# Patient Record
Sex: FEMALE | Race: WHITE | NOT HISPANIC OR LATINO | Employment: OTHER | URBAN - METROPOLITAN AREA
[De-identification: names, ages, dates, MRNs, and addresses within clinical notes are randomized per-mention and may not be internally consistent; named-entity substitution may affect disease eponyms.]

---

## 2017-09-07 ENCOUNTER — THERAPY VISIT (OUTPATIENT)
Dept: PHYSICAL THERAPY | Facility: CLINIC | Age: 67
End: 2017-09-07
Payer: MEDICARE

## 2017-09-07 DIAGNOSIS — G89.29 CHRONIC BILATERAL LOW BACK PAIN WITH RIGHT-SIDED SCIATICA: Primary | ICD-10-CM

## 2017-09-07 DIAGNOSIS — M54.41 CHRONIC BILATERAL LOW BACK PAIN WITH RIGHT-SIDED SCIATICA: Primary | ICD-10-CM

## 2017-09-07 PROCEDURE — G8979 MOBILITY GOAL STATUS: HCPCS | Mod: GP | Performed by: PHYSICAL THERAPIST

## 2017-09-07 PROCEDURE — 97110 THERAPEUTIC EXERCISES: CPT | Mod: GP | Performed by: PHYSICAL THERAPIST

## 2017-09-07 PROCEDURE — G8978 MOBILITY CURRENT STATUS: HCPCS | Mod: GP | Performed by: PHYSICAL THERAPIST

## 2017-09-07 PROCEDURE — 97161 PT EVAL LOW COMPLEX 20 MIN: CPT | Mod: GP | Performed by: PHYSICAL THERAPIST

## 2017-09-07 NOTE — LETTER
"DEPARTMENT OF HEALTH AND HUMAN SERVICES  CENTERS FOR MEDICARE & MEDICAID SERVICES    PLAN/UPDATED PLAN OF PROGRESS FOR OUTPATIENT REHABILITATION    PATIENTS NAME:  Heaven Canchola   : 1950  PROVIDER NUMBER:    [id number]  [facility]  PR[facility]ER NAME: [facility]  MEDICAL RECORD NUMBER: [id number]   START OF CARE DATE:  SOC Date: 17   TYPE:  PT  PRIMARY/TREATMENT DIAGNOSIS: (Pertinent Medical Diagnosis)  Chronic bilateral low back pain with right-sided sciatica  VISITS FROM START OF CARE:  Rxs Used: 1     [facility] Initial Evaluation  Subjective:  Patient is a 66 year old female presenting with rehab back hpi.   Heaven Canchola is a 66 year old female with a lumbar condition.  Condition occurred with:  Insidious onset.  Condition occurred: for unknown reasons.  This is a chronic condition  Patient notes B LBP with R lateral hip/thigh pain that intermittently goes down to R lateral ankle.  Pain has been present for \"years\" without specific/known cause.  She saw her MD on 17 and was subsequently referred to PT..    Patient reports pain:  Central lumbar spine, lumbar spine left and lumbar spine right.  Radiates to:  Gluteals right and gluteals left (down R lateral thigh and lower leg to lateral ankle).  Pain is described as aching and is intermittent and reported as 3/10.  Associated symptoms:  Loss of motion/stiffness (denies numbness/tingling/weakness). Pain is worse during the night.  Exacerbated by: prolonged walking, prolonged standing, lifting. Relieved by: stretching, adjusting dosage of amitriptyline.  Since onset symptoms are unchanged.  Special tests:  MRI (will call to see if can get reports faxed over).  Previous treatment includes chiropractic.  Improvement with previous treatment: gets relief, but temporary.  General health as reported by patient is good.  Pertinent medical history includes:  Osteoporosis and history " of fractures (chronic fatigue syndrome).  Medical allergies: no.  Surgical history: parotid gland tumor.  Medication history: amitriptyline for chronic fatigue syndrome.  Current occupation is retired.      Barriers include:  None as reported by the patient.  Red flags:  None as reported by the patient.    Objective:  LUMBAR:    Posture: fair  Posture Correction: no effect  Relevant Lateral Shift: none                    PATIENTS NAME:  Heaven Canchola   : 1950    Neurological:  Motor Deficit:  Myotomes L R   L1-2 (hip flexion) 5 5   L3 (knee extension) 5 5   L4 (ankle DF) 5 5   L5 (g. toe ext) 5 5   S1 (ankle PF or knee flex) 5 5   Sensory Deficit, Reflexes: intact light touch screen B LE dermatomes  Dural Signs:   L R   Slump negatiive Stretch in R ITB   SLR negative negative     AROM: (Major, Moderate, Minimal or Nil loss)  Movement Loss Mu Mod Min Nil Pain   Flexion    X To floor no effect   Extension  X   Tightness in buttocks   Side Gliding L    X R LBP   Side Gliding R    X R LBP   Repeated movement testing:   (During: produces, abolishes, increases, decreases, no effect, centralizing, peripheralizing; After: better, worse, no better, no worse, no effect, centralized, peripheralized)  Pre-test Symptoms Standing: B LBP R thigh to knee pain   Symptoms During Symptoms After ROM increased ROM decreased No Effect   FIS        Rep FIS Increased R  Lateral hip worse   X   EIS        Rep EIS Increased B gluteals No worse   X   Pre-test Symptoms Lying: B LBP    Symptoms During Symptoms After ROM increased ROM decreased No Effect   SANDEE        Rep SANDEE Increased B LB worse   X   EIL        Rep EIL Increased B LB No worse   X   Other Tests: forward bend appears symmetric at PSIS B during.     HIP:  PROM:   L  R   Flexion 120 anterior hip pain 120 anterior hip pain   Extension     Abduction     IR 35 25 lateral hip pain   ER 50 45 lateral hip pain     PATIENTS NAME:  Heaven Canchola   : 1950    Special  tests:   L R   Ortegas     Ramesh DOBBS Lateral hip pain Lateral hip pain   FADIR Anterior hip pain Anterior hip pain     Provisional Classification: possible derangement lumbar as main source of symptoms.  Does have some hip joint pain and lateral ITB type pain as well.   Principle of Management: will initiate repeated extension in lying lumbar.  Will work on hip joint and ITB stretching and strengthening as well.  Core strengthening and posture.     Assessment/Plan:    Patient is a 66 year old female with lumbar complaints.    Patient has the following significant findings with corresponding treatment plan.                Diagnosis 1:  LBP with R sided sciatica, some hip joint pain likely as well.     Pain -  hot/cold therapy, manual therapy and directional preference exercise  Decreased ROM/flexibility - manual therapy and therapeutic exercise  Decreased strength - therapeutic exercise and therapeutic activities  Decreased proprioception - neuro re-education and therapeutic activities  Decreased function - therapeutic activities  Impaired posture - neuro re-education    Therapy Evaluation Codes:   1) History comprised of:   Personal factors that impact the plan of care:      Time since onset of symptoms.    Comorbidity factors that impact the plan of care are:      None.     Medications impacting care: None.  2) Examination of Body Systems comprised of:   Body structures and functions that impact the plan of care:      Hip and Lumbar spine.   Activity limitations that impact the plan of care are:      Lifting, Standing, Walking and Sleeping.  3) Clinical presentation characteristics are:   Stable/Uncomplicated.  4) Decision-Making    Low complexity using standardized patient assessment instrument and/or measureable assessment of functional outcome.  Cumulative Therapy Evaluation is: Low complexity.    Previous and current functional limitations:  (See Goal Flow Sheet for this information)    Short term and  "Long term goals: (See Goal Flow Sheet for this information)     Communication ability:  Patient appears to be able to clearly communicate and understand verbal and written communication and follow directions correctly.  Treatment Explanation - The following has been discussed with the patient:   RX ordered/plan of care  PATIENTS NAME:  Heaven Canchola   : 1950    Anticipated outcomes  Possible risks and side effects  This patient would benefit from PT intervention to resume normal activities.   Rehab potential is good.    Frequency:  1 X week, once daily  Duration:  for 6 weeks  Discharge Plan:  Achieve all LTG.  Independent in home treatment program.  Reach maximal therapeutic benefit.        Caregiver Signature/Credentials _____________________________ Date ________       Treating Provider: Jack Patel DPT   I have reviewed and certified the need for these services and plan of treatment while under my care.        PHYSICIAN'S SIGNATURE:   _________________________________________  Date___________   Linda Griffiths MD    Certification period:  Beginning of Cert date period: 17 to  End of Cert period date: 17     Functional Level Progress Report: Please see attached \"Goal Flow sheet for Functional level.\"    ____X____ Continue Services or       ________ DC Services                Service dates: From  SOC Date: 17 date to present                         "

## 2017-09-08 NOTE — PROGRESS NOTES
"[facility] Initial Evaluation    Subjective:    Patient is a 66 year old female presenting with rehab back hpi.   Heaven Canchola is a 66 year old female with a lumbar condition.  Condition occurred with:  Insidious onset.  Condition occurred: for unknown reasons.  This is a chronic condition  Patient notes B LBP with R lateral hip/thigh pain that intermittently goes down to R lateral ankle.  Pain has been present for \"years\" without specific/known cause.  She saw her MD on 8/28/17 and was subsequently referred to PT..    Patient reports pain:  Central lumbar spine, lumbar spine left and lumbar spine right.  Radiates to:  Gluteals right and gluteals left (down R lateral thigh and lower leg to lateral ankle).  Pain is described as aching and is intermittent and reported as 3/10.  Associated symptoms:  Loss of motion/stiffness (denies numbness/tingling/weakness). Pain is worse during the night.  Exacerbated by: prolonged walking, prolonged standing, lifting. Relieved by: stretching, adjusting dosage of amitriptyline.  Since onset symptoms are unchanged.  Special tests:  MRI (will call to see if can get reports faxed over).  Previous treatment includes chiropractic.  Improvement with previous treatment: gets relief, but temporary.  General health as reported by patient is good.  Pertinent medical history includes:  Osteoporosis and history of fractures (chronic fatigue syndrome).  Medical allergies: no.  Surgical history: parotid gland tumor.  Medication history: amitriptyline for chronic fatigue syndrome.  Current occupation is retired.        Barriers include:  None as reported by the patient.    Red flags:  None as reported by the patient.                        Objective:  LUMBAR:    Posture: fair  Posture Correction: no effect  Relevant Lateral Shift: none    Neurological:    Motor Deficit:  Myotomes L R   L1-2 (hip flexion) 5 5   L3 (knee extension) 5 5   L4 (ankle DF) 5 5   L5 (g. toe ext) 5 5 "   S1 (ankle PF or knee flex) 5 5     Sensory Deficit, Reflexes: intact light touch screen B LE dermatomes    Dural Signs:   L R   Slump negatiive Stretch in R ITB   SLR negative negative     AROM: (Major, Moderate, Minimal or Nil loss)  Movement Loss Mu Mod Min Nil Pain   Flexion    X To floor no effect   Extension  X   Tightness in buttocks   Side Gliding L    X R LBP   Side Gliding R    X R LBP     Repeated movement testing:   (During: produces, abolishes, increases, decreases, no effect, centralizing, peripheralizing; After: better, worse, no better, no worse, no effect, centralized, peripheralized)    Pre-test Symptoms Standing: B LBP R thigh to knee pain   Symptoms During Symptoms After ROM increased ROM decreased No Effect   FIS        Rep FIS Increased R  Lateral hip worse   X   EIS        Rep EIS Increased B gluteals No worse   X   Pre-test Symptoms Lying: B LBP    Symptoms During Symptoms After ROM increased ROM decreased No Effect   SANDEE        Rep SANDEE Increased B LB worse   X   EIL        Rep EIL Increased B LB No worse   X     Other Tests: forward bend appears symmetric at PSIS B during.     HIP:    PROM:   L  R   Flexion 120 anterior hip pain 120 anterior hip pain   Extension     Abduction     IR 35 25 lateral hip pain   ER 50 45 lateral hip pain       Special tests:   L R   Roselia's     Ramesh     DILIA Lateral hip pain Lateral hip pain   FADIR Anterior hip pain Anterior hip pain     Provisional Classification: possible derangement lumbar as main source of symptoms.  Does have some hip joint pain and lateral ITB type pain as well.   Principle of Management: will initiate repeated extension in lying lumbar.  Will work on hip joint and ITB stretching and strengthening as well.  Core strengthening and posture.     System    Physical Exam    General     ROS    Assessment/Plan:      Patient is a 66 year old female with lumbar complaints.    Patient has the following significant findings with corresponding  treatment plan.                Diagnosis 1:  LBP with R sided sciatica, some hip joint pain likely as well.     Pain -  hot/cold therapy, manual therapy and directional preference exercise  Decreased ROM/flexibility - manual therapy and therapeutic exercise  Decreased strength - therapeutic exercise and therapeutic activities  Decreased proprioception - neuro re-education and therapeutic activities  Decreased function - therapeutic activities  Impaired posture - neuro re-education    Therapy Evaluation Codes:   1) History comprised of:   Personal factors that impact the plan of care:      Time since onset of symptoms.    Comorbidity factors that impact the plan of care are:      None.     Medications impacting care: None.  2) Examination of Body Systems comprised of:   Body structures and functions that impact the plan of care:      Hip and Lumbar spine.   Activity limitations that impact the plan of care are:      Lifting, Standing, Walking and Sleeping.  3) Clinical presentation characteristics are:   Stable/Uncomplicated.  4) Decision-Making    Low complexity using standardized patient assessment instrument and/or measureable assessment of functional outcome.  Cumulative Therapy Evaluation is: Low complexity.    Previous and current functional limitations:  (See Goal Flow Sheet for this information)    Short term and Long term goals: (See Goal Flow Sheet for this information)     Communication ability:  Patient appears to be able to clearly communicate and understand verbal and written communication and follow directions correctly.  Treatment Explanation - The following has been discussed with the patient:   RX ordered/plan of care  Anticipated outcomes  Possible risks and side effects  This patient would benefit from PT intervention to resume normal activities.   Rehab potential is good.    Frequency:  1 X week, once daily  Duration:  for 6 weeks  Discharge Plan:  Achieve all LTG.  Independent in home treatment  program.  Reach maximal therapeutic benefit.    Please refer to the daily flowsheet for treatment today, total treatment time and time spent performing 1:1 timed codes.

## 2017-09-19 ENCOUNTER — THERAPY VISIT (OUTPATIENT)
Dept: PHYSICAL THERAPY | Facility: CLINIC | Age: 67
End: 2017-09-19
Payer: MEDICARE

## 2017-09-19 DIAGNOSIS — G89.29 CHRONIC BILATERAL LOW BACK PAIN WITH RIGHT-SIDED SCIATICA: ICD-10-CM

## 2017-09-19 DIAGNOSIS — M54.41 CHRONIC BILATERAL LOW BACK PAIN WITH RIGHT-SIDED SCIATICA: ICD-10-CM

## 2017-09-19 PROCEDURE — 97110 THERAPEUTIC EXERCISES: CPT | Mod: GP | Performed by: PHYSICAL THERAPIST

## 2017-09-19 PROCEDURE — 97140 MANUAL THERAPY 1/> REGIONS: CPT | Mod: GP | Performed by: PHYSICAL THERAPIST

## 2017-10-09 ENCOUNTER — THERAPY VISIT (OUTPATIENT)
Dept: PHYSICAL THERAPY | Facility: CLINIC | Age: 67
End: 2017-10-09
Payer: MEDICARE

## 2017-10-09 DIAGNOSIS — M54.41 CHRONIC BILATERAL LOW BACK PAIN WITH RIGHT-SIDED SCIATICA: ICD-10-CM

## 2017-10-09 DIAGNOSIS — G89.29 CHRONIC BILATERAL LOW BACK PAIN WITH RIGHT-SIDED SCIATICA: ICD-10-CM

## 2017-10-09 PROCEDURE — 97140 MANUAL THERAPY 1/> REGIONS: CPT | Mod: GP | Performed by: PHYSICAL THERAPIST

## 2017-10-09 PROCEDURE — 97110 THERAPEUTIC EXERCISES: CPT | Mod: GP | Performed by: PHYSICAL THERAPIST

## 2017-10-30 ENCOUNTER — THERAPY VISIT (OUTPATIENT)
Dept: PHYSICAL THERAPY | Facility: CLINIC | Age: 67
End: 2017-10-30
Payer: MEDICARE

## 2017-10-30 DIAGNOSIS — M54.41 CHRONIC BILATERAL LOW BACK PAIN WITH RIGHT-SIDED SCIATICA: ICD-10-CM

## 2017-10-30 DIAGNOSIS — G89.29 CHRONIC BILATERAL LOW BACK PAIN WITH RIGHT-SIDED SCIATICA: ICD-10-CM

## 2017-10-30 PROCEDURE — 97110 THERAPEUTIC EXERCISES: CPT | Mod: GP | Performed by: PHYSICAL THERAPIST

## 2017-10-30 PROCEDURE — 97140 MANUAL THERAPY 1/> REGIONS: CPT | Mod: GP | Performed by: PHYSICAL THERAPIST

## 2018-07-07 PROBLEM — G89.29 CHRONIC BILATERAL LOW BACK PAIN WITH RIGHT-SIDED SCIATICA: Status: RESOLVED | Noted: 2017-09-07 | Resolved: 2018-07-07

## 2018-07-07 PROBLEM — M54.41 CHRONIC BILATERAL LOW BACK PAIN WITH RIGHT-SIDED SCIATICA: Status: RESOLVED | Noted: 2017-09-07 | Resolved: 2018-07-07

## 2018-07-08 NOTE — PROGRESS NOTES
DISCHARGE REPORT:     Patient did not return for formal reassessment after the last visit on 10/30/17.  Please see completed goal and daily flowsheets from that date, for last know status.  Patient will be discharged at this time.

## 2018-09-24 ENCOUNTER — APPOINTMENT (OUTPATIENT)
Dept: GENERAL RADIOLOGY | Facility: CLINIC | Age: 68
End: 2018-09-24
Attending: EMERGENCY MEDICINE
Payer: MEDICARE

## 2018-09-24 ENCOUNTER — HOSPITAL ENCOUNTER (EMERGENCY)
Facility: CLINIC | Age: 68
Discharge: HOME | End: 2018-09-24
Attending: EMERGENCY MEDICINE | Admitting: EMERGENCY MEDICINE
Payer: MEDICARE

## 2018-09-24 ENCOUNTER — HOSPITAL ENCOUNTER (EMERGENCY)
Facility: CLINIC | Age: 68
Discharge: HOME | End: 2018-09-24
Attending: NURSE PRACTITIONER | Admitting: NURSE PRACTITIONER
Payer: MEDICARE

## 2018-09-24 ENCOUNTER — NURSE TRIAGE (OUTPATIENT)
Dept: NURSING | Facility: CLINIC | Age: 68
End: 2018-09-24

## 2018-09-24 VITALS
RESPIRATION RATE: 16 BRPM | SYSTOLIC BLOOD PRESSURE: 152 MMHG | WEIGHT: 112 LBS | TEMPERATURE: 97.8 F | BODY MASS INDEX: 18.66 KG/M2 | DIASTOLIC BLOOD PRESSURE: 81 MMHG | HEIGHT: 65 IN | OXYGEN SATURATION: 96 %

## 2018-09-24 VITALS
OXYGEN SATURATION: 97 % | TEMPERATURE: 97.6 F | RESPIRATION RATE: 15 BRPM | DIASTOLIC BLOOD PRESSURE: 84 MMHG | SYSTOLIC BLOOD PRESSURE: 137 MMHG | WEIGHT: 111 LBS | HEIGHT: 65 IN | HEART RATE: 76 BPM | BODY MASS INDEX: 18.49 KG/M2

## 2018-09-24 DIAGNOSIS — S52.501A CLOSED FRACTURE OF DISTAL END OF RIGHT RADIUS, UNSPECIFIED FRACTURE MORPHOLOGY, INITIAL ENCOUNTER: ICD-10-CM

## 2018-09-24 DIAGNOSIS — Z46.89 ENCOUNTER FOR CAST CHECK: ICD-10-CM

## 2018-09-24 DIAGNOSIS — S52.611A CLOSED DISPLACED FRACTURE OF STYLOID PROCESS OF RIGHT ULNA, INITIAL ENCOUNTER: ICD-10-CM

## 2018-09-24 PROCEDURE — 40000986 XR WRIST RT G/E 3 VW: Mod: RT

## 2018-09-24 PROCEDURE — 96374 THER/PROPH/DIAG INJ IV PUSH: CPT | Mod: 59

## 2018-09-24 PROCEDURE — 99285 EMERGENCY DEPT VISIT HI MDM: CPT | Mod: 25

## 2018-09-24 PROCEDURE — 99152 MOD SED SAME PHYS/QHP 5/>YRS: CPT

## 2018-09-24 PROCEDURE — 25000132 ZZH RX MED GY IP 250 OP 250 PS 637: Mod: GY | Performed by: EMERGENCY MEDICINE

## 2018-09-24 PROCEDURE — A9270 NON-COVERED ITEM OR SERVICE: HCPCS | Mod: GY | Performed by: EMERGENCY MEDICINE

## 2018-09-24 PROCEDURE — 25000128 H RX IP 250 OP 636: Performed by: EMERGENCY MEDICINE

## 2018-09-24 PROCEDURE — 96376 TX/PRO/DX INJ SAME DRUG ADON: CPT

## 2018-09-24 PROCEDURE — 99283 EMERGENCY DEPT VISIT LOW MDM: CPT | Mod: 25,27

## 2018-09-24 PROCEDURE — 73110 X-RAY EXAM OF WRIST: CPT | Mod: RT

## 2018-09-24 PROCEDURE — 25000128 H RX IP 250 OP 636

## 2018-09-24 PROCEDURE — 40000275 ZZH STATISTIC RCP TIME EA 10 MIN

## 2018-09-24 PROCEDURE — 25605 CLTX DST RDL FX/EPHYS SEP W/: CPT | Mod: RT

## 2018-09-24 RX ORDER — PROPOFOL 10 MG/ML
INJECTION, EMULSION INTRAVENOUS
Status: DISCONTINUED
Start: 2018-09-24 | End: 2018-09-24 | Stop reason: HOSPADM

## 2018-09-24 RX ORDER — HYDROCODONE BITARTRATE AND ACETAMINOPHEN 5; 325 MG/1; MG/1
1 TABLET ORAL EVERY 4 HOURS PRN
Qty: 20 TABLET | Refills: 0 | Status: ON HOLD | OUTPATIENT
Start: 2018-09-24 | End: 2021-11-23

## 2018-09-24 RX ORDER — PROPOFOL 10 MG/ML
100 INJECTION, EMULSION INTRAVENOUS ONCE
Status: COMPLETED | OUTPATIENT
Start: 2018-09-24 | End: 2018-09-24

## 2018-09-24 RX ORDER — HYDROCODONE BITARTRATE AND ACETAMINOPHEN 5; 325 MG/1; MG/1
2 TABLET ORAL ONCE
Status: COMPLETED | OUTPATIENT
Start: 2018-09-24 | End: 2018-09-24

## 2018-09-24 RX ORDER — HYDROMORPHONE HYDROCHLORIDE 1 MG/ML
0.5 INJECTION, SOLUTION INTRAMUSCULAR; INTRAVENOUS; SUBCUTANEOUS
Status: DISCONTINUED | OUTPATIENT
Start: 2018-09-24 | End: 2018-09-24 | Stop reason: HOSPADM

## 2018-09-24 RX ORDER — ONDANSETRON 2 MG/ML
INJECTION INTRAMUSCULAR; INTRAVENOUS
Status: COMPLETED
Start: 2018-09-24 | End: 2018-09-24

## 2018-09-24 RX ADMIN — Medication 0.5 MG: at 02:30

## 2018-09-24 RX ADMIN — HYDROCODONE BITARTRATE AND ACETAMINOPHEN 2 TABLET: 5; 325 TABLET ORAL at 03:30

## 2018-09-24 RX ADMIN — PROPOFOL 80 MG: 10 INJECTION, EMULSION INTRAVENOUS at 02:22

## 2018-09-24 RX ADMIN — Medication 0.5 MG: at 01:09

## 2018-09-24 RX ADMIN — ONDANSETRON 4 MG: 2 INJECTION INTRAMUSCULAR; INTRAVENOUS at 01:09

## 2018-09-24 ASSESSMENT — ENCOUNTER SYMPTOMS
MYALGIAS: 1
HEADACHES: 0
MYALGIAS: 0
ARTHRALGIAS: 1
NUMBNESS: 0

## 2018-09-24 NOTE — ED PROVIDER NOTES
"  History     Chief Complaint:  Arm Injury    HPI   Heaven Canchola is a right hand dominant 67 year old female with a history of osteopenia, chronic fatigue, and hypotension, who presents with right wrist pain. The patient states that she was unable to fall asleep this evening, and so sometime during the night got out of her bed. Upon standing, however, she believes that her socks must have slipped on the hardwood floor, causing her to fall into the wall, injuring her right wrist. She denies any other injuries at this time.     Allergies:  No known drug allergies    Medications:    Amitriptyline  Tramadol  Elavil    Past Medical History:    Osteopenia  Hypotension  Chronic fatigue    Past Surgical History:    Breast biopsy, bilateral  Colonoscopy  Superficial parotidectomy    Family History:    Bronchitis    Social History:  The patient was accompanied to the ED by her .  Smoking Status: Never  Smokeless Tobacco: Never  Alcohol Use: Not on file  Marital Status:       Review of Systems   Musculoskeletal: Positive for arthralgias and myalgias.   Neurological: Negative for syncope and headaches.   All other systems reviewed and are negative.    Physical Exam   First Vitals:  BP: (!) 150/94  Heart Rate: 73  Temp: 97.6  F (36.4  C)  Resp: 18  Height: 165.1 cm (5' 5\")  Weight: 50.3 kg (111 lb)  SpO2: 100 %    Physical Exam  Nursing note and vitals reviewed.  Constitutional:  Oriented to person, place, and time. Cooperative.   HENT:   Nose:    Nose normal.   Mouth/Throat:   Mucous membranes are normal.   Eyes:    Conjunctivae normal and EOM are normal.      Pupils are equal, round, and reactive to light.   Neck:    Trachea normal.   Cardiovascular:  Normal rate, regular rhythm, normal heart sounds and normal pulses. No murmur heard.  Pulmonary/Chest:  Effort normal and breath sounds normal.   Abdominal:   Soft. Normal appearance and bowel sounds are normal.      There is no tenderness.      There is no rebound " and no CVA tenderness.   Musculoskeletal:  Obvious deformity to the right wrist where there is swelling and tenderness to palpation.  Extremities otherwise atraumatic x 4.   Lymphadenopathy:  No cervical adenopathy.   Neurological:   Alert and oriented to person, place, and time. Normal strength.      No cranial nerve deficit or sensory deficit. GCS eye subscore is 4. GCS verbal subscore is 5. GCS motor subscore is 6.  Distal CMS intact in the right hand.  Skin:    Skin is intact. No rash noted.   Psychiatric:   Normal mood and affect.    Emergency Department Course     Imaging:  Radiology findings were communicated with the patient who voiced understanding of the findings.  XR Wrist Right 3 Views:  IMPRESSION:   1. Acute comminuted, impacted fracture of the distal right radius.  There is 0.9 cm of posterior displacement and moderate posterior  angulation about the fracture. No convincing involvement of the  radiocarpal joint.  2. Minimally displaced acute comminuted transverse fracture of the  ulnar styloid process.    Per radiology report    XR Wrist Right 3 Views (post reduction):  IMPRESSION:  1. Acute comminuted and impacted fracture of the distal right radius.  There is 0.9 cm of posterior displacement and moderate posterior  angulation about the fracture. No convincing involvement of the  radiocarpal joint.  2. Minimally displaced acute comminuted transverse fracture of the  ulnar styloid process.    THERESE JONES MD    Procedures:    [facility] Procedure Note        Sedation:      Performed by: Windy Espana  Authorized by: Windy Espana    Pre-Procedure Assessment done at 0200.    Expected Level:  Deep Sedation    Indication:  Sedation is required to allow for fracture reduction    Consent obtained from patient after discussing the risks, benefits and alternatives.    PO Intake:  Appropriately NPO for procedure    ASA Class:  Class 1 - HEALTHY PATIENT    Mallampati:  Grade 1:  Soft palate,  uvula, tonsillar pillars, and posterior pharyngeal wall visible    Lungs: Lungs Clear with good breath sounds bilaterally.     Heart: Normal heart sounds and rate    History and physical reviewed and no updates needed. I have reviewed the lab findings, diagnostic data, medications, and the plan for sedation. I have determined this patient to be an appropriate candidate for the planned sedation and procedure and have reassessed the patient IMMEDIATELY PRIOR to sedation and procedure.    Sedation Post Procedure Summary:    Prior to the start of the procedure and with procedural staff participation, I verbally confirmed the patient s identity using two indicators, relevant allergies, that the procedure was appropriate and matched the consent or emergent situation, and that the correct equipment/implants were available. Immediately prior to starting the procedure I conducted the Time Out with the procedural staff and re-confirmed the patient s name, procedure, and site/side. (The Joint Commission universal protocol was followed.)  Yes      Sedatives: Propofol    Vital signs, airway, End Tidal CO2 and pulse oximetry were monitored and remained stable throughout the procedure and sedation was maintained until the procedure was complete.  The patient was monitored by staff until sedation discharge criteria were met.    Patient tolerance: Patient tolerated the procedure well with no immediate complications.    Time of sedation in minutes:  10 minutes from beginning to end of physician one to one monitoring.    PROCEDURE: Wrist reduction, right    Anesthesia: The patient was consciously sedated by Dr. Shaun Nicholas     Technique: Traction was applied and angulation was recreated and reduced. Good alignment  was confirmed by fluoroscopy and post reduction films were obtained. The patient tolerated the  procedure well and there were no complications.    Interventions:  0109 - Dilaudid 0.5 mg IV  0109 - Zofran 2 mg/mL  injection  0222 - Diprivan 10 mg/mL vial IV  0330 - Norco 325 mg 2 tablets PO    Emergency Department Course:  Nursing notes and vitals reviewed.    The patient was sent for a right wrist x-ray while in the emergency department, results above.     0055: I performed an exam of the patient as documented above.   0220: procedure performed as noted above.     Findings and plan explained to the Patient. Patient discharged home with instructions regarding supportive care, medications, and reasons to return. The importance of close follow-up was reviewed.     Impression & Plan      Medical Decision Making:  This is a 67-year-old female who came in for further evaluation of an injury to her right wrist.  Based on her exam, I was quite confident that she would have a fracture.  X-rays confirmed this.  I then reduced the fracture per the above procedure note using procedural anesthesia.  We placed her in a plaster sugar tong splint as well.  She was provided a sling to go home with as well as a prescription for Norco.  I will have her follow-up with Dr. Leslie at [facility], and they left a message on her voicemail with the patient's contact information.    Diagnosis:    ICD-10-CM    1. Comminuted closed fracture of distal end of right radius, unspecified fracture morphology, initial encounter S52.501A    2. Closed displaced fracture of styloid process of right ulna, initial encounter S52.611A        Disposition:  discharged to home    Discharge Medication List as of 9/24/2018  3:17 AM      START taking these medications    Details   HYDROcodone-acetaminophen (NORCO) 5-325 MG per tablet Take 1 tablet by mouth every 4 hours as needed for pain, Disp-20 tablet, R-0, Local Print               Windy Espana  9/24/2018    EMERGENCY DEPARTMENT  Windy HAMMER am serving as a scribe at 12:55 AM on 9/24/2018 to document services personally performed by Shaun Nicholas MD based on my observations and the  provider's statements to me.       Shaun Nicholas MD  09/24/18 [id number]

## 2018-09-24 NOTE — ED AVS SNAPSHOT
Emergency Department    [address] [place] [id number]    Phone:  [phone]    Fax:  [phone]                                       Heaven Canchola   MRN: [id number]    Department:   Emergency Department   Date of Visit:  9/24/2018           After Visit Summary Signature Page     I have received my discharge instructions, and my questions have been answered. I have discussed any challenges I see with this plan with the nurse or doctor.    ..........................................................................................................................................  Patient/Patient Representative Signature      ..........................................................................................................................................  Patient Representative Print Name and Relationship to Patient    ..................................................               ................................................  Date                                   Time    ..........................................................................................................................................  Reviewed by Signature/Title    ...................................................              ..............................................  Date                                               Time          22EPIC Rev 08/18

## 2018-09-24 NOTE — TELEPHONE ENCOUNTER
Heaven broke her right arm and cast put on at the hospital.  Denies numbness or tingling.  Heaven has questions on what to look for if cast is too tight.

## 2018-09-24 NOTE — ED AVS SNAPSHOT
Emergency Department    [address] [facility] [address]    Phone:  [phone]    Fax:  [phone]                                       Heaven Canchola   MRN: [id number]    Department:   Emergency Department   Date of Visit:  9/24/2018           Patient Information     Date Of Birth          1950        Your diagnoses for this visit were:     Encounter for cast check        You were seen by Dione Frazier, CNP.      Follow-up Information     Follow up with Linda Griffiths.    Specialty:  Internal Medicine    Why:  As needed    Contact information:    [facility]  [address] [facility] [place] [facility] [id number] [phone]          Follow up with Your orthopedist .    Why:  as scheduled        Follow up with  Emergency Department.    Specialty:  EMERGENCY MEDICINE    Why:  As needed, If symptoms worsen    Contact information:    [address] [facility] [id number] [phone]      Discharge References/Attachments     CAST, CARE OF YOUR (ENGLISH)      24 Hour Appointment Hotline       To make an appointment at any [facility], call [phone] ([phone]). If you don't have a family doctor or clinic, we will help you find one. [facility] clinics are conveniently located to serve the needs of you and your family.             Review of your medicines      Our records show that you are taking the medicines listed below. If these are incorrect, please call your family doctor or clinic.        Dose / Directions Last dose taken    AMITRIPTYLINE HCL PO        Take  by mouth.   Refills:  0        calcium carbonate 500 mg {elemental} 500 MG tablet   Commonly known as:  OS-BEN   Dose:  500 mg        Take 500 mg by mouth 2 times daily   Refills:  0        HYDROcodone-acetaminophen 5-325 MG per tablet   Commonly known as:  NORCO   Dose:  1 tablet   Quantity:  20 tablet        Take 1 tablet by mouth every 4 hours as needed for pain   Refills:  0        Multi-vitamin Tabs  tablet   Dose:  1 tablet        Take 1 tablet by mouth daily   Refills:  0        TRAMADOL HCL PO        Take  by mouth.   Refills:  0                Orders Needing Specimen Collection     None      Pending Results     No orders found from 9/22/2018 to 9/25/2018.            Pending Culture Results     No orders found from 9/22/2018 to 9/25/2018.            Pending Results Instructions     If you had any lab results that were not finalized at the time of your Discharge, you can call the ED Lab Result RN at [phone]. You will be contacted by this team for any positive Lab results or changes in treatment. The nurses are available 7 days a week from 10A to 6:30P.  You can leave a message 24 hours per day and they will return your call.        Test Results From Your Hospital Stay               Clinical Quality Measure: Blood Pressure Screening     Your blood pressure was checked while you were in the emergency department today. The last reading we obtained was  BP: 152/81 . Please read the guidelines below about what these numbers mean and what you should do about them.  If your systolic blood pressure (the top number) is less than 120 and your diastolic blood pressure (the bottom number) is less than 80, then your blood pressure is normal. There is nothing more that you need to do about it.  If your systolic blood pressure (the top number) is 120-139 or your diastolic blood pressure (the bottom number) is 80-89, your blood pressure may be higher than it should be. You should have your blood pressure rechecked within a year by a primary care provider.  If your systolic blood pressure (the top number) is 140 or greater or your diastolic blood pressure (the bottom number) is 90 or greater, you may have high blood pressure. High blood pressure is treatable, but if left untreated over time it can put you at risk for heart attack, stroke, or kidney failure. You should have your blood pressure rechecked by a primary care  "provider within the next 4 weeks.  If your provider in the emergency department today gave you specific instructions to follow-up with your doctor or provider even sooner than that, you should follow that instruction and not wait for up to 4 weeks for your follow-up visit.        Thank you for choosing [facility]       Thank you for choosing [facility] for your care. Our goal is always to provide you with excellent care. Hearing back from our patients is one way we can continue to improve our services. Please take a few minutes to complete the written survey that you may receive in the mail after you visit with us. Thank you!        [employer] Information     [employer] lets you send messages to your doctor, view your test results, renew your prescriptions, schedule appointments and more. To sign up, go to [email or web address] . Click on \"Log in\" on the left side of the screen, which will take you to the Welcome page. Then click on \"Sign up Now\" on the right side of the page.     You will be asked to enter the access code listed below, as well as some personal information. Please follow the directions to create your username and password.     Your access code is: [email or web address]  Expires: 2018  3:17 AM     Your access code will  in 90 days. If you need help or a new code, please call your [facility] clinic or [phone].        Care EveryWhere ID     This is your Care EveryWhere ID. This could be used by other organizations to access your [facility] medical records  [id number]        Equal Access to Services     LUDMILA JANE AH: Hadii keila Barajas, waaxda dominick, qaybta kaalmaishan aguila . So [facility] [phone].    ATENCIÓN: Si habla español, tiene a black disposición servicios gratuitos de asistencia lingüística. Llame al [phone].    We comply with applicable federal civil rights laws and [place] laws. We do not discriminate on the basis of race, color, " national origin, age, disability, sex, sexual orientation, or gender identity.            After Visit Summary       This is your record. Keep this with you and show to your community pharmacist(s) and doctor(s) at your next visit.

## 2018-09-24 NOTE — TELEPHONE ENCOUNTER
FNA triage call :   Presenting problem :  @3am today discharged  From [facility] ED Dx     With  FX of R arm and has new splint .    Guideline used : Splint symptoms or questions - adults.   Disposition and recommendations :  FNA Advised to elevate arm on 2 large  pillow at a table or desk to get RUE above the level of the heart if any problems of tingling or numbness or coldness or blueness and if not improved in 1 hour return to ED and Pt agrees. Pt Has appt with ortho tomorrow to determine if surgery is needed.    Verbalizes understanding and denies further questions .  Ema Nolan RN  - [facility] Nurse Advisor     Reason for Disposition    [1] Fingers or toes are swollen (compared to noninjured side) AND [2] persists > 24 hours after loosening elastic bandage    Additional Information    Splint or elastic bandage problems or questions    Negative: Cast problems or questions    Negative: Chest pain    Negative: Difficulty breathing    Negative: [1] Numbness (loss of sensation) of fingers or toes AND [2] persists > 1 hour after loosening elastic bandage    Negative: [1] Tingling (pins and needles sensation) of fingers or toes AND [2] persists > 1 hour after loosening elastic bandage    Negative: [1] Blueness or pallor of fingers or toes (compared to noninjured side) AND [2] persists > 1 hour after loosening elastic bandage    Negative: Patient sounds very sick or weak to the triager    Negative: [1] SEVERE pain AND [2] not improved one hour after pain medicine, elevation, and loosening elastic bandage    Negative: [1] Increasing pain under splint AND [2] splint put on > 72 hours ago    Negative: Caller has URGENT question and triager unable to answer question    Negative: Splint breaks or cracks    Negative: Edge of splint is causing a sore    Negative: [1] Plaster splint gets wet AND [2] is soft after attempted drying    Negative: [1] Splint lining gets wet AND [2] pins present and touching wet area    Negative:  Caller has NON-URGENT question and triager unable to answer question    Protocols used: SPLINT SYMPTOMS AND QUESTIONS-ADULT-AH, CAST SYMPTOMS AND QUESTIONS-ADULT-AH

## 2018-09-24 NOTE — DISCHARGE INSTRUCTIONS
Understanding a Distal Radius Fracture      A fracture is a broken bone. A fracture in the distal radius is a break in the lower end of the radius. This is the larger bone in the forearm. Because the break occurs near the wrist, it is often called a wrist fracture.    The bone may be cracked, or it may be broken into 2 or more pieces. The pieces of bone may be lined up or they may have moved out of place. Sometimes, the bone may break through the skin. Nearby nerves, tissues, and joints also may be damaged. Depending on the severity of the fracture, healing may take several months or longer.  What causes a distal radius fracture?  This type of fracture is most often caused from a fall on an outstretched hand. It can also be caused from a blow, accident, or sports injury.  Symptoms of a distal radius fracture  Symptoms can include pain, swelling, and bruising. If the bone breaks through the skin, external bleeding can also occur. The wrist may look crooked, deformed, or bent. It may be hard to move or use the arm, wrist, and hand for normal tasks and activities.  Treating a distal radius fracture  Treatment depends on how serious the fracture is. If needed, the bone is put back into place. This may be done with or without surgery. If surgery is needed, the surgeon may use devices such as pins, plates, or screws to hold the bone together. You may need to wear a splint or cast for a month or longer to protect the bone and keep it in place during healing. Other treatments may be also used to help reduce symptoms or regain function. These include:    Cold packs. Putting an ice pack on the injured area may help reduce swelling and pain.    Raising the arm and wrist. Keeping the arm and wrist raised above heart level may help reduce swelling.    Pain medicines. Taking prescription or over-the-counter pain medicines may help reduce pain and swelling.    Exercises. Doing certain exercises at home or with a physical  therapist can help restore strength, flexibility, and range of motion in your arm, wrist, and hand. In general, exercises are not started until after the splint or cast is removed.  Possible complications of a distal radius fracture  These can include:    Poor healing of the bone    Weakness, stiffness, or loss of range of motion in the arm, wrist, or hand    Osteoarthritis in the wrist joint  When to call your healthcare provider  Call your healthcare provider right away if you have any of these:    Fever of 100.4 F (38 C) or higher, or as directed    Symptoms that don t get better with treatment, or get worse    Numbness, coldness, or swelling in your arm, hand, or fingers    Fingernails that turn blue or gray in color    A splint or cast that is damaged or feels too tight or loose    New symptoms   Date Last Reviewed: 3/10/2016    2290-0736 [employer]. [address] [place] [address]. All rights reserved. This information is not intended as a substitute for professional medical care. Always follow your healthcare professional's instructions.

## 2018-09-24 NOTE — ED AVS SNAPSHOT
Emergency Department    [address] [place] [address]    Phone:  [phone]    Fax:  [phone]                                       Heaven Canchola   MRN: [id number]    Department:   Emergency Department   Date of Visit:  9/24/2018           After Visit Summary Signature Page     I have received my discharge instructions, and my questions have been answered. I have discussed any challenges I see with this plan with the nurse or doctor.    ..........................................................................................................................................  Patient/Patient Representative Signature      ..........................................................................................................................................  Patient Representative Print Name and Relationship to Patient    ..................................................               ................................................  Date                                   Time    ..........................................................................................................................................  Reviewed by Signature/Title    ...................................................              ..............................................  Date                                               Time          22EPIC Rev 08/18

## 2018-09-24 NOTE — ED AVS SNAPSHOT
Emergency Department    [address] [facility] [address]    Phone:  [phone]    Fax:  [phone]                                       Heaven Canchola   MRN: [id number]    Department:   Emergency Department   Date of Visit:  9/24/2018           Patient Information     Date Of Birth          1950        Your diagnoses for this visit were:     Comminuted closed fracture of distal end of right radius, unspecified fracture morphology, initial encounter     Closed displaced fracture of styloid process of right ulna, initial encounter        You were seen by Shaun Nicholas MD.      Follow-up Information     Schedule an appointment as soon as possible for a visit with Aure Leslie MD.    Specialty:  Orthopedics    Contact information:    [facility]  [address] [place] [id number] [phone]          Follow up with  Emergency Department.    Specialty:  EMERGENCY MEDICINE    Why:  If symptoms worsen    Contact information:    [address] [facility] [address] [phone]        Discharge Instructions         Understanding a Distal Radius Fracture      A fracture is a broken bone. A fracture in the distal radius is a break in the lower end of the radius. This is the larger bone in the forearm. Because the break occurs near the wrist, it is often called a wrist fracture.    The bone may be cracked, or it may be broken into 2 or more pieces. The pieces of bone may be lined up or they may have moved out of place. Sometimes, the bone may break through the skin. Nearby nerves, tissues, and joints also may be damaged. Depending on the severity of the fracture, healing may take several months or longer.  What causes a distal radius fracture?  This type of fracture is most often caused from a fall on an outstretched hand. It can also be caused from a blow, accident, or sports injury.  Symptoms of a distal radius fracture  Symptoms can  include pain, swelling, and bruising. If the bone breaks through the skin, external bleeding can also occur. The wrist may look crooked, deformed, or bent. It may be hard to move or use the arm, wrist, and hand for normal tasks and activities.  Treating a distal radius fracture  Treatment depends on how serious the fracture is. If needed, the bone is put back into place. This may be done with or without surgery. If surgery is needed, the surgeon may use devices such as pins, plates, or screws to hold the bone together. You may need to wear a splint or cast for a month or longer to protect the bone and keep it in place during healing. Other treatments may be also used to help reduce symptoms or regain function. These include:    Cold packs. Putting an ice pack on the injured area may help reduce swelling and pain.    Raising the arm and wrist. Keeping the arm and wrist raised above heart level may help reduce swelling.    Pain medicines. Taking prescription or over-the-counter pain medicines may help reduce pain and swelling.    Exercises. Doing certain exercises at home or with a physical therapist can help restore strength, flexibility, and range of motion in your arm, wrist, and hand. In general, exercises are not started until after the splint or cast is removed.  Possible complications of a distal radius fracture  These can include:    Poor healing of the bone    Weakness, stiffness, or loss of range of motion in the arm, wrist, or hand    Osteoarthritis in the wrist joint  When to call your healthcare provider  Call your healthcare provider right away if you have any of these:    Fever of 100.4 F (38 C) or higher, or as directed    Symptoms that don t get better with treatment, or get worse    Numbness, coldness, or swelling in your arm, hand, or fingers    Fingernails that turn blue or gray in color    A splint or cast that is damaged or feels too tight or loose    New symptoms   Date Last Reviewed:  3/10/2016    [phone]. [address] [place] [address], [place] [address]. All rights reserved. This information is not intended as a substitute for professional medical care. Always follow your healthcare professional's instructions.          24 Hour Appointment Hotline       To make an appointment at any [facility], call [phone] ([phone]). If you don't have a family doctor or clinic, we will help you find one. [facility] are conveniently located to serve the needs of you and your family.             Review of your medicines      START taking        Dose / Directions Last dose taken    HYDROcodone-acetaminophen 5-325 MG per tablet   Commonly known as:  NORCO   Dose:  1 tablet   Quantity:  20 tablet        Take 1 tablet by mouth every 4 hours as needed for pain   Refills:  0          Our records show that you are taking the medicines listed below. If these are incorrect, please call your family doctor or clinic.        Dose / Directions Last dose taken    AMITRIPTYLINE HCL PO        Take  by mouth.   Refills:  0        calcium carbonate 500 mg {elemental} 500 MG tablet   Commonly known as:  OS-BEN   Dose:  500 mg        Take 500 mg by mouth 2 times daily   Refills:  0        Multi-vitamin Tabs tablet   Dose:  1 tablet        Take 1 tablet by mouth daily   Refills:  0        TRAMADOL HCL PO        Take  by mouth.   Refills:  0                Information about OPIOIDS     PRESCRIPTION OPIOIDS: WHAT YOU NEED TO KNOW   We gave you an opioid (narcotic) pain medicine. It is important to manage your pain, but opioids are not always the best choice. You should first try all the other options your care team gave you. Take this medicine for as short a time (and as few doses) as possible.    Some activities can increase your pain, such as bandage changes or therapy sessions. It may help to take your pain medicine 30 to 60 minutes before these activities. Reduce your stress by getting  enough sleep, working on hobbies you enjoy and practicing relaxation or meditation. Talk to your care team about ways to manage your pain beyond prescription opioids.    These medicines have risks:    DO NOT drive when on new or higher doses of pain medicine. These medicines can affect your alertness and reaction times, and you could be arrested for driving under the influence (DUI). If you need to use opioids long-term, talk to your care team about driving.    DO NOT operate heavy machinery    DO NOT do any other dangerous activities while taking these medicines.    DO NOT drink any alcohol while taking these medicines.     If the opioid prescribed includes acetaminophen, DO NOT take with any other medicines that contain acetaminophen. Read all labels carefully. Look for the word  acetaminophen  or  Tylenol.  Ask your pharmacist if you have questions or are unsure.    You can get addicted to pain medicines, especially if you have a history of addiction (chemical, alcohol or substance dependence). Talk to your care team about ways to reduce this risk.    All opioids tend to cause constipation. Drink plenty of water and eat foods that have a lot of fiber, such as fruits, vegetables, prune juice, apple juice and high-fiber cereal. Take a laxative (Miralax, milk of magnesia, Colace, Senna) if you don t move your bowels at least every other day. Other side effects include upset stomach, sleepiness, dizziness, throwing up, tolerance (needing more of the medicine to have the same effect), physical dependence and slowed breathing.    Store your pills in a secure place, locked if possible. We will not replace any lost or stolen medicine. If you don t finish your medicine, please throw away (dispose) as directed by your pharmacist. The [place] [employer] has more information about safe disposal: https://www.pca.state.[place]/living-green/managing-unwanted-medications        Prescriptions were sent or printed at  these locations (1 Prescription)                   Other Prescriptions                Printed at Department/Unit printer (1 of 1)         HYDROcodone-acetaminophen (NORCO) 5-325 MG per tablet                Procedures and tests performed during your visit     Procedure/Test Number of Times Performed    Peripheral IV catheter 1    XR Wrist Right 3 Views 2      Orders Needing Specimen Collection     None      Pending Results     No orders found from 9/22/2018 to 9/25/2018.            Pending Culture Results     No orders found from 9/22/2018 to 9/25/2018.            Pending Results Instructions     If you had any lab results that were not finalized at the time of your Discharge, you can call the ED Lab Result RN at [phone]. You will be contacted by this team for any positive Lab results or changes in treatment. The nurses are available 7 days a week from 10A to 6:30P.  You can leave a message 24 hours per day and they will return your call.        Test Results From Your Hospital Stay        9/24/2018  3:13 AM      Narrative     RIGHT WRIST 3 VIEWS   9/24/2018 1:30 AM     HISTORY: Pain after fall.    COMPARISON: None.        Impression     IMPRESSION:  1. Acute comminuted and impacted fracture of the distal right radius.  There is 0.9 cm of posterior displacement and moderate posterior  angulation about the fracture. No convincing involvement of the  radiocarpal joint.  2. Minimally displaced acute comminuted transverse fracture of the  ulnar styloid process.    THERESE JONES MD         9/24/2018  3:12 AM      Narrative     RIGHT WRIST 3 VIEWS   9/24/2018 2:52 AM     HISTORY: Postreduction.    COMPARISON: 9/24/2018 at 0124 hours.        Impression     IMPRESSION:   1. Interval placement of a cast, which obscures underlying bone  detail.  2. Acute fractures of the distal right radius and ulna are again  noted, with interval improvement in alignment. The major fracture  fragments are now in near-anatomic  alignment.    THERESE JONES MD                Clinical Quality Measure: Blood Pressure Screening     Your blood pressure was checked while you were in the emergency department today. The last reading we obtained was  BP: 137/84 . Please read the guidelines below about what these numbers mean and what you should do about them.  If your systolic blood pressure (the top number) is less than 120 and your diastolic blood pressure (the bottom number) is less than 80, then your blood pressure is normal. There is nothing more that you need to do about it.  If your systolic blood pressure (the top number) is 120-139 or your diastolic blood pressure (the bottom number) is 80-89, your blood pressure may be higher than it should be. You should have your blood pressure rechecked within a year by a primary care provider.  If your systolic blood pressure (the top number) is 140 or greater or your diastolic blood pressure (the bottom number) is 90 or greater, you may have high blood pressure. High blood pressure is treatable, but if left untreated over time it can put you at risk for heart attack, stroke, or kidney failure. You should have your blood pressure rechecked by a primary care provider within the next 4 weeks.  If your provider in the emergency department today gave you specific instructions to follow-up with your doctor or provider even sooner than that, you should follow that instruction and not wait for up to 4 weeks for your follow-up visit.        Thank you for choosing [facility]       Thank you for choosing [facility] for your care. Our goal is always to provide you with excellent care. Hearing back from our patients is one way we can continue to improve our services. Please take a few minutes to complete the written survey that you may receive in the mail after you visit with us. Thank you!        [facility] [employer] Information     [employer] lets you send messages to your doctor, view your test results, renew your prescriptions,  "schedule appointments and more. To sign up, go to [email or web address] . Click on \"Log in\" on the left side of the screen, which will take you to the Welcome page. Then click on \"Sign up Now\" on the right side of the page.     You will be asked to enter the access code listed below, as well as some personal information. Please follow the directions to create your username and password.     Your access code is: [email or web address]  Expires: 2018  3:17 AM     Your access code will  in 90 days. If you need help or a new code, please call your [facility] clinic or [phone].        Care EveryWhere ID     This is your Care EveryWhere ID. This could be used by other organizations to access your [facility] medical records  [id number]        Equal Access to Services     LUDMILA JANE : Rajat Barajas, josesito tan, joel martin, ishan solorzano . So [facility] [phone].    ATENCIÓN: Si habla español, tiene a black disposición servicios gratuitos de asistencia lingüística. Llame al [phone].    We comply with applicable federal civil rights laws and [place] laws. We do not discriminate on the basis of race, color, national origin, age, disability, sex, sexual orientation, or gender identity.            After Visit Summary       This is your record. Keep this with you and show to your community pharmacist(s) and doctor(s) at your next visit.                  "

## 2018-09-25 NOTE — ED PROVIDER NOTES
"  History     Chief Complaint:    Cast Problem     HPI   Heaven Canchola is a 67 year old female who presents to the ED for evaluation of a cast problem. The patient was seen overnight last night and was diagnosed with a right wrist fracture. The fracture was reduced and a sugar tong splint was placed and she has been wearing a sling since then. Tonight, the patient state that she her fingers turned blue after her shower and which prompted her return to the ED. Here fingers have since returned to normal color. She states that she is still able to move her fingers, has normal sensation in her hands, and denies any new or worsening pain in her wrist or in the area of her cast. Her pain is 2/10.  She has taken no medication for her pain today. She notes some elbow discomfort which she attributes to her sling.     Allergies:  The patient has no known drug allergies.    Medications:    Amitriptyline  Norco  Tramadol    Past Medical History:    Chronic fatigue syndrome    Past Surgical History:    Breast biopsy  Breast surgery  Colonoscopy    Family History:    Bronchitis    Social History:  Negative for tobacco use.  Negative for alcohol use.  Marital Status:   [2]    Review of Systems   Musculoskeletal: Negative for myalgias.   Neurological: Negative for numbness.   All other systems reviewed and are negative.    Physical Exam     Patient Vitals for the past 24 hrs:   BP Temp Temp src Heart Rate Resp SpO2 Height Weight   09/24/18 2049 152/81 97.8  F (36.6  C) Oral 84 16 96 % 1.651 m (5' 5\") 50.8 kg (112 lb)     Physical Exam  Nursing notes reviewed. Vitals reviewed.  General: Alert. Well kept.  Eyes:  Conjunctiva non-injected, non-icteric.  Neck/Throat: Moist mucous membranes. Normal voice.  Cardiac: Regular rhythm. Normal heart sounds.  Pulmonary: Clear and equal breath sounds bilaterally.   Musculoskeletal: Wearing splint on right upper extremity.  Normal sensation and motion of all fingers right hand with pink " color and warm to touch.  No pain to palpation over medial or lateral forearm.  No erythema or abrasion to area under cast.   Neurological: Alert and oriented x4.   Skin: Warm and dry. Normal appearance of visualized exposed skin without rashes or petechiae.  Psych: Affect normal. Good eye contact.    Emergency Department Course   Emergency Department Course:  Nursing notes and vitals reviewed. (2107) I performed an exam of the patient as documented above.     Findings and plan explained to the Patient. Patient discharged home with instructions regarding supportive care, medications, and reasons to return. The importance of close follow-up was reviewed.     Impression & Plan    Medical Decision Making:  Heaven Canchola is a 67 year old female who presents for evaluation of cast check. She was seen in the ED approximately 19 hours ago with a commuted closed fracture of the distal end of the right radius, has procedural sedation and wrist reduction completed. She was placed in a sugar tong splint and discharged home. She states her pain is only over the distal radius and ulna,  is mild at 2/10, and she has not required any pain medication today.  She notes that after being in the shower she had bluish fingers and presents for evaluation. On examination her fingers are currently pink, warm, with intact sensation with complete flexion and extension without pain or weakness. She notes her symptoms are currently completely resolved. The upper part of the cast was unwrapped and I was able to palpate along the compartments on the dorsal and the volar aspect of the arm and she has no tenderness to palpation and denies pain currently over those sites. She had some mild tenderness in the antecubital area and was noted to have some twisting of the padding material which was straightened out and she had near resolution of her discomfort in the antecubital area. There are no signs of venous or arterial insufficiency due to swelling,  the cast is without wetness, and has not caused restriction. There are no signs of compartment syndrome. She has normal function in the hand. No indication for cast removal or re-XR imaging today with complete resolution of her symptoms. Symptoms are likely related to being in a dependent position after the shower and she also did have a tight band around the upper arm to hold the trash bag that covered her cast  in place which may have caused some constriction. She is given detailed instructions on symptoms to watch for including increasing pain, any paresthesia, any pallor, and inability to move her fingers, or any change in her symptoms and she will return to the ED.     Diagnosis:    ICD-10-CM    1. Encounter for cast check Z46.89      Disposition:  discharged to home  Scribe Disclosure:  I,  Bandar Quinones, am serving as a scribe on 9/24/2018 at 9:07 PM to personally document services performed by Dione Frazier CNP based on my observations and the provider's statements to me.      Bandar Quinones  9/24/2018    EMERGENCY DEPARTMENT       Dione Frazier CNP  09/24/18 4741

## 2018-10-02 ENCOUNTER — NURSE TRIAGE (OUTPATIENT)
Dept: NURSING | Facility: CLINIC | Age: 68
End: 2018-10-02

## 2018-10-02 NOTE — TELEPHONE ENCOUNTER
Pt states she had a nerve block today and was told she would regain the feeling in her arm between 8-16 hours after the procedure.  She is concerned that she is on hour 15 with no improvement.  Pt had the procedure done at [facility], pt states their phone number is [phone].  Writer unable to see procedure visit in her chart.  Writer advised her to contact their answering service and have the provider on call paged.  She stated her understanding and had no further questions.  She will call back if she is unable to get assistance from them.      Leanne Todd RN/FNA

## 2021-05-31 ENCOUNTER — RECORDS - HEALTHEAST (OUTPATIENT)
Dept: ADMINISTRATIVE | Facility: CLINIC | Age: 71
End: 2021-05-31

## 2021-09-03 DIAGNOSIS — Z11.59 ENCOUNTER FOR SCREENING FOR OTHER VIRAL DISEASES: ICD-10-CM

## 2021-10-23 DIAGNOSIS — Z11.59 ENCOUNTER FOR SCREENING FOR OTHER VIRAL DISEASES: ICD-10-CM

## 2021-11-20 ENCOUNTER — LAB (OUTPATIENT)
Dept: URGENT CARE | Facility: URGENT CARE | Age: 71
End: 2021-11-20
Attending: COLON & RECTAL SURGERY
Payer: MEDICARE

## 2021-11-20 DIAGNOSIS — Z11.59 ENCOUNTER FOR SCREENING FOR OTHER VIRAL DISEASES: ICD-10-CM

## 2021-11-20 PROCEDURE — U0005 INFEC AGEN DETEC AMPLI PROBE: HCPCS

## 2021-11-20 PROCEDURE — U0003 INFECTIOUS AGENT DETECTION BY NUCLEIC ACID (DNA OR RNA); SEVERE ACUTE RESPIRATORY SYNDROME CORONAVIRUS 2 (SARS-COV-2) (CORONAVIRUS DISEASE [COVID-19]), AMPLIFIED PROBE TECHNIQUE, MAKING USE OF HIGH THROUGHPUT TECHNOLOGIES AS DESCRIBED BY CMS-2020-01-R: HCPCS

## 2021-11-21 LAB — SARS-COV-2 RNA RESP QL NAA+PROBE: NEGATIVE

## 2021-11-23 ENCOUNTER — HOSPITAL ENCOUNTER (OUTPATIENT)
Facility: CLINIC | Age: 71
Discharge: HOME | End: 2021-11-23
Attending: COLON & RECTAL SURGERY | Admitting: COLON & RECTAL SURGERY
Payer: MEDICARE

## 2021-11-23 VITALS
DIASTOLIC BLOOD PRESSURE: 72 MMHG | WEIGHT: 112 LBS | RESPIRATION RATE: 13 BRPM | SYSTOLIC BLOOD PRESSURE: 105 MMHG | OXYGEN SATURATION: 100 % | HEART RATE: 63 BPM | BODY MASS INDEX: 18.66 KG/M2 | HEIGHT: 65 IN

## 2021-11-23 LAB — COLONOSCOPY: NORMAL

## 2021-11-23 PROCEDURE — 99153 MOD SED SAME PHYS/QHP EA: CPT | Performed by: COLON & RECTAL SURGERY

## 2021-11-23 PROCEDURE — 45378 DIAGNOSTIC COLONOSCOPY: CPT | Performed by: COLON & RECTAL SURGERY

## 2021-11-23 PROCEDURE — G0105 COLORECTAL SCRN; HI RISK IND: HCPCS | Performed by: COLON & RECTAL SURGERY

## 2021-11-23 PROCEDURE — 250N000011 HC RX IP 250 OP 636: Performed by: COLON & RECTAL SURGERY

## 2021-11-23 PROCEDURE — G0500 MOD SEDAT ENDO SERVICE >5YRS: HCPCS | Performed by: COLON & RECTAL SURGERY

## 2021-11-23 RX ORDER — NALOXONE HYDROCHLORIDE 0.4 MG/ML
0.4 INJECTION, SOLUTION INTRAMUSCULAR; INTRAVENOUS; SUBCUTANEOUS
Status: DISCONTINUED | OUTPATIENT
Start: 2021-11-23 | End: 2021-11-23 | Stop reason: HOSPADM

## 2021-11-23 RX ORDER — LIDOCAINE 40 MG/G
CREAM TOPICAL
Status: DISCONTINUED | OUTPATIENT
Start: 2021-11-23 | End: 2021-11-23 | Stop reason: HOSPADM

## 2021-11-23 RX ORDER — FENTANYL CITRATE 50 UG/ML
INJECTION, SOLUTION INTRAMUSCULAR; INTRAVENOUS PRN
Status: COMPLETED | OUTPATIENT
Start: 2021-11-23 | End: 2021-11-23

## 2021-11-23 RX ORDER — ONDANSETRON 2 MG/ML
4 INJECTION INTRAMUSCULAR; INTRAVENOUS EVERY 6 HOURS PRN
Status: DISCONTINUED | OUTPATIENT
Start: 2021-11-23 | End: 2021-11-23 | Stop reason: HOSPADM

## 2021-11-23 RX ORDER — NALOXONE HYDROCHLORIDE 0.4 MG/ML
0.2 INJECTION, SOLUTION INTRAMUSCULAR; INTRAVENOUS; SUBCUTANEOUS
Status: DISCONTINUED | OUTPATIENT
Start: 2021-11-23 | End: 2021-11-23 | Stop reason: HOSPADM

## 2021-11-23 RX ORDER — FLUMAZENIL 0.1 MG/ML
0.2 INJECTION, SOLUTION INTRAVENOUS
Status: DISCONTINUED | OUTPATIENT
Start: 2021-11-23 | End: 2021-11-23 | Stop reason: HOSPADM

## 2021-11-23 RX ORDER — ONDANSETRON 4 MG/1
4 TABLET, ORALLY DISINTEGRATING ORAL EVERY 6 HOURS PRN
Status: DISCONTINUED | OUTPATIENT
Start: 2021-11-23 | End: 2021-11-23 | Stop reason: HOSPADM

## 2021-11-23 RX ORDER — PROCHLORPERAZINE MALEATE 5 MG
5 TABLET ORAL EVERY 6 HOURS PRN
Status: DISCONTINUED | OUTPATIENT
Start: 2021-11-23 | End: 2021-11-23 | Stop reason: HOSPADM

## 2021-11-23 RX ORDER — ONDANSETRON 2 MG/ML
4 INJECTION INTRAMUSCULAR; INTRAVENOUS
Status: DISCONTINUED | OUTPATIENT
Start: 2021-11-23 | End: 2021-11-23 | Stop reason: HOSPADM

## 2021-11-23 RX ADMIN — MIDAZOLAM 2 MG: 1 INJECTION INTRAMUSCULAR; INTRAVENOUS at 08:13

## 2021-11-23 RX ADMIN — FENTANYL CITRATE 100 MCG: 50 INJECTION, SOLUTION INTRAMUSCULAR; INTRAVENOUS at 08:13

## 2021-11-23 ASSESSMENT — MIFFLIN-ST. JEOR: SCORE: 1023.91

## 2021-11-23 NOTE — H&P
"Pre-Endoscopy History and Physical   Heaven Canchola MRN# [id number]   YOB: 1950 Age: 70 year old   Date of Procedure: 11/23/2021   Primary care provider: Linda Griffiths   Type of Endoscopy: colonoscopy   Reason for Procedure: personal history of polyps   Type of Anesthesia Anticipated: Moderate Sedation   HPI:   Heaven is a 70 year old female with a personal history of polyps.  She last had colonoscopies in 2011 and 2016 that were both normal.  She denies BRBPR, abdominal pain, nausea/vomiting, changes in bowel habits or unintentional weight loss.  She denies a FH of CRC.    No Known Allergies   Prior to Admission Medications   Prescriptions Last Dose Informant Patient Reported? Taking?   AMITRIPTYLINE HCL PO 11/22/2021 at Unknown time  Yes Yes   Sig: Take  by mouth.     TRAMADOL HCL PO Past Month at Unknown time  Yes Yes   Sig: Take  by mouth.     calcium carbonate (OS- MG [place]. [place]) 500 MG tablet Past Week at Unknown time  Yes Yes   Sig: Take 500 mg by mouth 2 times daily   multivitamin, therapeutic with minerals (MULTI-VITAMIN) TABS Past Week at Unknown time  Yes Yes   Sig: Take 1 tablet by mouth daily      Facility-Administered Medications: None      Patient Active Problem List   Diagnosis   (none) - all problems resolved or deleted      Past Medical History:   Diagnosis Date     Chronic fatigue syndrome       Past Surgical History:   Procedure Laterality Date     BREAST BIOPSY, CORE RT/LT       BREAST SURGERY       COLONOSCOPY N/A 9/13/2016    Procedure: COLONOSCOPY;  Surgeon: Easton Irving MD;  Location:  GI      Social History     Tobacco Use     Smoking status: Never Smoker     Smokeless tobacco: Never Used   Substance Use Topics     Alcohol use: No      Family History   Problem Relation Age of Onset     Bronchitis Father       PHYSICAL EXAM:   /79   Pulse 71   Resp 16   Ht 1.651 m (5' 5\")   Wt 50.8 kg (112 lb)   SpO2 99%   BMI 18.64 kg/m   Estimated body mass index " "is 18.64 kg/m  as calculated from the following:    Height as of this encounter: 1.651 m (5' 5\").    Weight as of this encounter: 50.8 kg (112 lb).   RESP: lungs clear to auscultation - no rales, rhonchi or wheezes   CV: regular rates and rhythm   ASA Class 2 - Mild systemic disease    Assessment: 69 y/o woman with a personal history of polyps    Plan: Colonoscopy with moderate sedation.  Risks of the procedure were discussed including, but not limited to, bleeding, perforation and missed lesions.  Patient understands and is willing to proceed.    Wyatt Francis MD ....................  11/23/2021   8:06 AM  Colon and Rectal Surgery Staff  [phone]    "

## (undated) DEVICE — SOL WATER IRRIG 1000ML BOTTLE 2F7114

## (undated) RX ORDER — FENTANYL CITRATE 50 UG/ML
INJECTION, SOLUTION INTRAMUSCULAR; INTRAVENOUS
Status: DISPENSED
Start: 2021-11-23